# Patient Record
Sex: MALE
[De-identification: names, ages, dates, MRNs, and addresses within clinical notes are randomized per-mention and may not be internally consistent; named-entity substitution may affect disease eponyms.]

---

## 2020-08-07 PROBLEM — Z00.00 ENCOUNTER FOR PREVENTIVE HEALTH EXAMINATION: Status: ACTIVE | Noted: 2020-08-07

## 2020-08-11 ENCOUNTER — APPOINTMENT (OUTPATIENT)
Dept: UROLOGY | Facility: CLINIC | Age: 35
End: 2020-08-11
Payer: COMMERCIAL

## 2020-08-11 VITALS
WEIGHT: 200 LBS | DIASTOLIC BLOOD PRESSURE: 69 MMHG | TEMPERATURE: 98.1 F | SYSTOLIC BLOOD PRESSURE: 156 MMHG | BODY MASS INDEX: 24.36 KG/M2 | OXYGEN SATURATION: 98 % | HEIGHT: 76 IN | HEART RATE: 74 BPM

## 2020-08-11 DIAGNOSIS — N52.8 OTHER MALE ERECTILE DYSFUNCTION: ICD-10-CM

## 2020-08-11 PROCEDURE — 99204 OFFICE O/P NEW MOD 45 MIN: CPT

## 2020-08-11 RX ORDER — SILDENAFIL 100 MG/1
100 TABLET, FILM COATED ORAL
Qty: 10 | Refills: 11 | Status: ACTIVE | COMMUNITY
Start: 2020-08-11 | End: 1900-01-01

## 2020-08-11 NOTE — HISTORY OF PRESENT ILLNESS
[FreeTextEntry1] : 35M generally healthy referred with ED. . comes in with complaint of difficulty with maintiaining erection and achieving orgasm. complains of decreased penile sensitivity. 8/10 erections at baseline. 10/10 nocturnal erections. intermittent desire. no pain. no dysuria. no hematuria. no fevers chills. no signficiant urinary frequency or urgency. no family hisotry porstate kdiney bladder cacner. \par previously tried viagra once\par unclear dose\par no significant improvement

## 2020-08-11 NOTE — PHYSICAL EXAM
[General Appearance - Well Developed] : well developed [General Appearance - Well Nourished] : well nourished [Well Groomed] : well groomed [Normal Appearance] : normal appearance [] : no respiratory distress [Heart Rate And Rhythm] : Heart rate and rhythm were normal [Abdomen Soft] : soft [Abdomen Tenderness] : non-tender [Costovertebral Angle Tenderness] : no ~M costovertebral angle tenderness [Abdomen Hernia] : no hernia was discovered [Penis Abnormality] : normal circumcised penis [Urethral Meatus] : meatus normal [Scrotum] : the scrotum was normal [Urinary Bladder Findings] : the bladder was normal on palpation [Testes Tenderness] : no tenderness of the testes [Epididymis] : the epididymides were normal [Testes Mass (___cm)] : there were no testicular masses [Normal Station and Gait] : the gait and station were normal for the patient's age [Oriented To Time, Place, And Person] : oriented to person, place, and time [Skin Color & Pigmentation] : normal skin color and pigmentation [No Palpable Adenopathy] : no palpable adenopathy [No Focal Deficits] : no focal deficits

## 2020-08-11 NOTE — LETTER BODY
[Dear  ___] : Dear  [unfilled], [FreeTextEntry1] : I had the pleasure of seeing your patient,  MOY BRYANT, a 35 year old man, in the office in consultation today. Please see the attached note for full details.\par \par Thank you very much for allowing me to participate in the care of this patient. If you have any questions please feel free to call me at any time. \par \par \par Sincerely yours,\par \par \par \par Ulices Xiong MD, DAVID\par Director, Male Fertility and Microsurgery\par  of Urology\par [facility]\par \par \par This letter has been dictated using computer software but not reviewed to expedite transmission.\par  [FreeTextEntry2] : Abbey Doshi MD\par [address], [facility] [address]\par [facility] [id number]

## 2020-08-11 NOTE — LETTER BODY
[Dear  ___] : Dear  [unfilled], [FreeTextEntry1] : I had the pleasure of seeing your patient,  MOY BRYANT, a 35 year old man, in the office in consultation today. Please see the attached note for full details.\par \par Thank you very much for allowing me to participate in the care of this patient. If you have any questions please feel free to call me at any time. \par \par \par Sincerely yours,\par \par \par \par Ulices Xiong MD, DAVID\par Director, Male Fertility and Microsurgery\par  of Urology\par [facility]\par \par \par This letter has been dictated using computer software but not reviewed to expedite transmission.\par  [FreeTextEntry2] : Abbey Doshi MD\par [address], [facility] [address]\par [facility] [address]

## 2020-08-11 NOTE — PHYSICAL EXAM
[General Appearance - Well Nourished] : well nourished [General Appearance - Well Developed] : well developed [Well Groomed] : well groomed [Normal Appearance] : normal appearance [] : no respiratory distress [Abdomen Soft] : soft [Heart Rate And Rhythm] : Heart rate and rhythm were normal [Abdomen Tenderness] : non-tender [Costovertebral Angle Tenderness] : no ~M costovertebral angle tenderness [Abdomen Hernia] : no hernia was discovered [Scrotum] : the scrotum was normal [Urethral Meatus] : meatus normal [Penis Abnormality] : normal circumcised penis [Urinary Bladder Findings] : the bladder was normal on palpation [Testes Tenderness] : no tenderness of the testes [Epididymis] : the epididymides were normal [Normal Station and Gait] : the gait and station were normal for the patient's age [Testes Mass (___cm)] : there were no testicular masses [No Focal Deficits] : no focal deficits [Oriented To Time, Place, And Person] : oriented to person, place, and time [Skin Color & Pigmentation] : normal skin color and pigmentation [No Palpable Adenopathy] : no palpable adenopathy

## 2020-08-11 NOTE — ASSESSMENT
[FreeTextEntry1] : psychogenic ED\par discussed at length\par normal nocturnal erections\par reassured\par discussed confidence restoration\par trial [id number]

## 2020-08-11 NOTE — ASSESSMENT
[FreeTextEntry1] : psychogenic ED\par discussed at length\par normal nocturnal erections\par reassured\par discussed confidence restoration\par trial [id number]

## 2020-10-02 ENCOUNTER — APPOINTMENT (OUTPATIENT)
Dept: UROLOGY | Facility: CLINIC | Age: 35
End: 2020-10-02